# Patient Record
Sex: FEMALE | Race: WHITE
[De-identification: names, ages, dates, MRNs, and addresses within clinical notes are randomized per-mention and may not be internally consistent; named-entity substitution may affect disease eponyms.]

---

## 2017-02-13 ENCOUNTER — HOSPITAL ENCOUNTER (OUTPATIENT)
Dept: HOSPITAL 47 - RADMAMWWP | Age: 62
Discharge: HOME | End: 2017-02-13
Payer: COMMERCIAL

## 2017-02-13 DIAGNOSIS — R92.8: Primary | ICD-10-CM

## 2017-02-13 NOTE — MM
Reason for exam: additional evaluation requested from prior study.

Last mammogram was performed 2 years and 1 month ago.



History:

Patient is postmenopausal.

Family history of breast cancer in paternal grandmother and breast cancer in 

maternal aunt.

Taking hormonal contraceptives for 7 years.  Took progesterone for 6 years.



Physical Findings:

Nurse did not find any significant physical abnormalities on exam.



MG 3D Diag Mammo W/Cad IRWIN

Bilateral CC and MLO view(s) were taken.

Prior study comparison: January 20, 2015, bilateral MG diagnostic mammo w CAD IRWIN.

February 21, 2012, bilateral digital screening mammo w/CAD.

There is chronic nodularity in the right breast. Post operative changes in the 

right breast.

No significant new findings when compared with previous films.



These results were verbally communicated with the patient and result sheet given 

to the patient on 2/13/17.





ASSESSMENT: Benign, BI-RAD 2



RECOMMENDATION:

Routine screening mammogram of both breasts in 1 year.

## 2018-03-24 ENCOUNTER — HOSPITAL ENCOUNTER (OUTPATIENT)
Dept: HOSPITAL 47 - RADMRIMAIN | Age: 63
End: 2018-03-24
Attending: INTERNAL MEDICINE
Payer: MEDICARE

## 2018-03-24 DIAGNOSIS — Z98.890: ICD-10-CM

## 2018-03-24 DIAGNOSIS — R51: Primary | ICD-10-CM

## 2018-03-24 LAB — BUN SERPL-SCNC: 26 MG/DL (ref 7–17)

## 2018-03-24 PROCEDURE — 84520 ASSAY OF UREA NITROGEN: CPT

## 2018-03-24 PROCEDURE — 82565 ASSAY OF CREATININE: CPT

## 2018-03-24 PROCEDURE — 70553 MRI BRAIN STEM W/O & W/DYE: CPT

## 2018-03-24 NOTE — MR
EXAMINATION TYPE: MR brain wo/w con

 

DATE OF EXAM: 3/24/2018

 

COMPARISON: Prior MR brain 10/1/2014

 

HISTORY: Headache, unusual duration

 

TECHNIQUE: 

Multiplanar, multisequence images of the brain and brainstem is performed without and with IV contras
t, utilizing 11 mL intravenous Gadavist .

 

FINDINGS: Diffusion weighted images demonstrate no evidence of a recent infarct or other diffusion ab
normality.  There is no extra-axial fluid collection. Small focus of increased signal on inversion re
covery and T2-weighted sequences present at the level of the middle cerebellar peduncle on the right 
shows corresponding intermediate signal on T1-weighted images. Scattered hyperintensities are present
 in the periventricular white matter on inversion recovery and T2-weighted sequences. The ventricular
 system and cisternal spaces are normal in size and appearance.  The brain volume is age appropriate.


 

Midline structures demonstrate normal morphology.  The craniocervical junction appears within normal 
limits.  Post contrast images demonstrate no abnormal enhancement. The dural venous sinuses appear pa
tent. The visualized sinuses are clear and the globes are intact. Some inflammatory change noted in t
he mastoid air cells on the left. Right parietal calvarium shows a focus of increased signal on inver
carloz recovery, T2-weighted sequences which show some enhancement and is stable compatible with cranio
tootie. Postop changes at this level show stable appearance.

 

IMPRESSION: Postop changes, white matter demyelination changes are stable. Correlate for migraine hea
daches, multiple sclerosis, hypertension, chronic small vessel ischemia.

## 2019-01-02 ENCOUNTER — HOSPITAL ENCOUNTER (OUTPATIENT)
Dept: HOSPITAL 47 - OR | Age: 64
Discharge: HOME | End: 2019-01-02
Attending: INTERNAL MEDICINE
Payer: MEDICARE

## 2019-01-02 VITALS — TEMPERATURE: 97.5 F | RESPIRATION RATE: 16 BRPM

## 2019-01-02 VITALS — BODY MASS INDEX: 29 KG/M2

## 2019-01-02 VITALS — HEART RATE: 71 BPM | DIASTOLIC BLOOD PRESSURE: 56 MMHG | SYSTOLIC BLOOD PRESSURE: 111 MMHG

## 2019-01-02 DIAGNOSIS — Z79.1: ICD-10-CM

## 2019-01-02 DIAGNOSIS — Z87.891: ICD-10-CM

## 2019-01-02 DIAGNOSIS — K21.9: ICD-10-CM

## 2019-01-02 DIAGNOSIS — D72.828: ICD-10-CM

## 2019-01-02 DIAGNOSIS — Z98.51: ICD-10-CM

## 2019-01-02 DIAGNOSIS — Z96.643: ICD-10-CM

## 2019-01-02 DIAGNOSIS — D75.89: Primary | ICD-10-CM

## 2019-01-02 DIAGNOSIS — Z79.899: ICD-10-CM

## 2019-01-02 DIAGNOSIS — I10: ICD-10-CM

## 2019-01-02 DIAGNOSIS — Z88.5: ICD-10-CM

## 2019-01-02 DIAGNOSIS — Z79.82: ICD-10-CM

## 2019-01-02 DIAGNOSIS — Z90.49: ICD-10-CM

## 2019-01-02 DIAGNOSIS — D72.821: ICD-10-CM

## 2019-01-02 LAB
BASOPHILS # BLD AUTO: 0.1 K/UL (ref 0–0.2)
BASOPHILS NFR BLD AUTO: 1 %
EOSINOPHIL # BLD AUTO: 0.3 K/UL (ref 0–0.7)
EOSINOPHIL NFR BLD AUTO: 2 %
ERYTHROCYTE [DISTWIDTH] IN BLOOD BY AUTOMATED COUNT: 4.24 M/UL (ref 3.8–5.4)
ERYTHROCYTE [DISTWIDTH] IN BLOOD: 18.6 % (ref 11.5–15.5)
HCT VFR BLD AUTO: 39.3 % (ref 34–46)
HGB BLD-MCNC: 12.2 GM/DL (ref 11.4–16)
LYMPHOCYTES # SPEC AUTO: 2.5 K/UL (ref 1–4.8)
LYMPHOCYTES NFR SPEC AUTO: 20 %
MCH RBC QN AUTO: 28.8 PG (ref 25–35)
MCHC RBC AUTO-ENTMCNC: 31.1 G/DL (ref 31–37)
MCV RBC AUTO: 92.7 FL (ref 80–100)
MONOCYTES # BLD AUTO: 0.2 K/UL (ref 0–1)
MONOCYTES NFR BLD AUTO: 1 %
NEUTROPHILS # BLD AUTO: 9.1 K/UL (ref 1.3–7.7)
NEUTROPHILS NFR BLD AUTO: 73 %
PLATELET # BLD AUTO: 183 K/UL (ref 150–450)
WBC # BLD AUTO: 12.5 K/UL (ref 3.8–10.6)

## 2019-01-02 PROCEDURE — 38222 DX BONE MARROW BX & ASPIR: CPT

## 2019-01-02 PROCEDURE — 85025 COMPLETE CBC W/AUTO DIFF WBC: CPT

## 2019-01-02 NOTE — PCN
PROCEDURE NOTE



DATE OF PROCEDURE:

01/02/2019.



PREOPERATIVE DIAGNOSIS:

Bone marrow replacing process.



POSTOPERATIVE DIAGNOSIS:

Bone marrow replacing process.



ANESTHESIA:

Local with IV systemic sedation.



DETAILS:

Utilizing sterile technique, the skin overlying the right iliac crest with appropriate

Betadine alcohol after adequate sterile draping, local anesthesia with 1% lidocaine and

systemic sedation, size 11.4 inch Jamshidi needle was utilized to access the periosteum

with ease.  A total of 12 mL of aspirate as well as 3 cm bone core biopsies were

obtained.  The patient tolerated the procedure very well.  There were no immediate

procedure-related complications.



TOTAL BLOOD LOSS:

Less than 1 mL.



Results pending.





MMODL / IJN: [id number] / Job#: [id number]